# Patient Record
(demographics unavailable — no encounter records)

---

## 2024-11-19 NOTE — IMAGING
[de-identified] : left shoulder: full ROM with discomfort +tenderness at the point and medial border of the scapula on instability NVI  xrays left shoulder: no obvious fracture

## 2024-11-19 NOTE — HISTORY OF PRESENT ILLNESS
[Sudden] : sudden [6] : 6 [3] : 3 [Sharp] : sharp [Constant] : constant [Student] : Work status: student [de-identified] : 14 YF with injury to her left scapula falling down the stairs yesterday. [] : no [FreeTextEntry5] : pt fell down the stairs yesterday and hurt her left shoulder and mid back. and says she has some pain when she breaths.  [facility] and [facility] of her ribs and said no fractures,  ice and heat didnt help tylenol helped a little.

## 2024-11-19 NOTE — ASSESSMENT
[FreeTextEntry1] : PT referral rest from sports f/u with Dr Torres in 1 week she has a swim meet this weekend and may compete if she is feeling up to it.

## 2024-11-27 NOTE — HISTORY OF PRESENT ILLNESS
[de-identified] : 11.6568: Monica is a 14 year old female presenting thoracic back pain after falling down a flight of stairs  on 11/18/4. Saw MARTHA Oh who referred her over. Pain underneath the left shoulder blade. No radiation of pain. No numbness.  No prior spine issues No chirocare/acupuncture Has not started PT  Pmhx: None Pshx: None  No hx diabetes/cancer  9th grade  xrays reviewed L shoulder - negative, ribs okay   xrays today: T-spine 2v - negative

## 2024-11-27 NOTE — DISCUSSION/SUMMARY
[de-identified] : reviewed the case and the imaging with the patient  back pain after fall  no signs of fracture  bruise - rib can hurt for weeks  discussion of the condition and treatment options cautions discussed questions answered discussion of natural history of the condition and what the next step would be give it time

## 2025-03-29 NOTE — PHYSICAL EXAM
[Alert] : alert [No Acute Distress] : no acute distress [Normocephalic] : normocephalic [EOMI Bilateral] : EOMI bilateral [Clear tympanic membranes with bony landmarks and light reflex present bilaterally] : clear tympanic membranes with bony landmarks and light reflex present bilaterally  [Pink Nasal Mucosa] : pink nasal mucosa [Nonerythematous Oropharynx] : nonerythematous oropharynx [Supple, full passive range of motion] : supple, full passive range of motion [No Palpable Masses] : no palpable masses [Clear to Auscultation Bilaterally] : clear to auscultation bilaterally [Regular Rate and Rhythm] : regular rate and rhythm [Normal S1, S2 audible] : normal S1, S2 audible [No Murmurs] : no murmurs [+2 Femoral Pulses] : +2 femoral pulses [Soft] : soft [NonTender] : non tender [Non Distended] : non distended [No Hepatomegaly] : no hepatomegaly [No Splenomegaly] : no splenomegaly [Justino: ____] : Justino [unfilled] [Justino: _____] : Justino [unfilled] [No Abnormal Lymph Nodes Palpated] : no abnormal lymph nodes palpated [Normal Muscle Tone] : normal muscle tone [No Gait Asymmetry] : no gait asymmetry [No pain or deformities with palpation of bone, muscles, joints] : no pain or deformities with palpation of bone, muscles, joints [Straight] : straight [Cranial Nerves Grossly Intact] : cranial nerves grossly intact [No Rash or Lesions] : no rash or lesions [de-identified] : Evaluation for scoliosis:  No scoliosis on exam, ( Normal [facility] [place] Test).

## 2025-03-29 NOTE — HISTORY OF PRESENT ILLNESS
[Mother] : mother [Yes] : Patient goes to dentist yearly [Normal] : normal [Age of Menarche: ____] : Age of Menarche: [unfilled] [Eats meals with family] : eats meals with family [Has family members/adults to turn to for help] : has family members/adults to turn to for help [Sleep Concerns] : no sleep concerns [Normal Performance] : normal performance [Normal Behavior/Attention] : normal behavior/attention [Has concerns about body or appearance] : does not have concerns about body or appearance [Has friends] : has friends [Uses electronic nicotine delivery system] : does not use electronic nicotine delivery system [Uses tobacco] : does not use tobacco [Uses drugs] : does not use drugs  [Drinks alcohol] : does not drink alcohol [No] : No cigarette smoke exposure [Uses safety belts/safety equipment] : uses safety belts/safety equipment  [Has ways to cope with stress] : has ways to cope with stress [Displays self-confidence] : displays self-confidence [Has problems with sleep] : does not have problems with sleep [Gets depressed, anxious, or irritable/has mood swings] : does not get depressed, anxious, or irritable/has mood swings [Has thought about hurting self or considered suicide] : has not thought about hurting self or considered suicide

## 2025-03-31 NOTE — PHYSICAL EXAM
[de-identified] : Evaluation for scoliosis:  No scoliosis on exam, ( Normal [facility] [place] Test).

## 2025-03-31 NOTE — HISTORY OF PRESENT ILLNESS
[Sleep Concerns] : no sleep concerns [Has concerns about body or appearance] : does not have concerns about body or appearance [Uses electronic nicotine delivery system] : does not use electronic nicotine delivery system [Uses tobacco] : does not use tobacco [Uses drugs] : does not use drugs  [Drinks alcohol] : does not drink alcohol [Has problems with sleep] : does not have problems with sleep [Gets depressed, anxious, or irritable/has mood swings] : does not get depressed, anxious, or irritable/has mood swings [Has thought about hurting self or considered suicide] : has not thought about hurting self or considered suicide